# Patient Record
Sex: FEMALE | Race: WHITE | Employment: FULL TIME
[De-identification: names, ages, dates, MRNs, and addresses within clinical notes are randomized per-mention and may not be internally consistent; named-entity substitution may affect disease eponyms.]

---

## 2017-07-22 ENCOUNTER — HEALTH MAINTENANCE LETTER (OUTPATIENT)
Age: 57
End: 2017-07-22

## 2017-10-07 ENCOUNTER — HEALTH MAINTENANCE LETTER (OUTPATIENT)
Age: 57
End: 2017-10-07

## 2017-10-24 ENCOUNTER — HOSPITAL ENCOUNTER (OUTPATIENT)
Dept: MAMMOGRAPHY | Facility: CLINIC | Age: 57
Discharge: HOME | End: 2017-10-24
Attending: PEDIATRICS | Admitting: PEDIATRICS
Payer: COMMERCIAL

## 2017-10-24 DIAGNOSIS — Z12.31 VISIT FOR SCREENING MAMMOGRAM: ICD-10-CM

## 2017-10-24 PROCEDURE — 77063 BREAST TOMOSYNTHESIS BI: CPT

## 2018-10-17 ENCOUNTER — MEDICAL CORRESPONDENCE (OUTPATIENT)
Dept: HEALTH INFORMATION MANAGEMENT | Facility: CLINIC | Age: 58
End: 2018-10-17

## 2018-10-17 ENCOUNTER — TRANSFERRED RECORDS (OUTPATIENT)
Dept: HEALTH INFORMATION MANAGEMENT | Facility: CLINIC | Age: 58
End: 2018-10-17

## 2018-10-18 ENCOUNTER — TELEPHONE (OUTPATIENT)
Dept: OTOLARYNGOLOGY | Facility: CLINIC | Age: 58
End: 2018-10-18

## 2018-10-18 NOTE — TELEPHONE ENCOUNTER
"Spoke with pt regarding new symptoms of perforation with scab. Pt states local ENT thought this was beginning to heal, however there was a \"blood tinged streak on the inside of the ear\" which pt finds concerning. She would like to know if we would like to do an MRI prior to her upcoming appt. Explained that per note from 2016 no further scans were needed because the chance of reoccurrence was so low. Pt should plan on coming to appt as scheduled with no imaging prior. However, her symptoms will be relayed to Dr. Morrow and if he has any additional recommendations we will contact her. Pt states understanding. Michela HARDY RNCC    "

## 2018-10-22 ENCOUNTER — TELEPHONE (OUTPATIENT)
Dept: OTOLARYNGOLOGY | Facility: CLINIC | Age: 58
End: 2018-10-22

## 2018-10-22 DIAGNOSIS — D18.00 GLOMUS TUMOR: Primary | ICD-10-CM

## 2018-10-22 NOTE — TELEPHONE ENCOUNTER
Spoke with pt regarding follow up plan. Relayed per Dr. Morrow we should do a CT Temporal Bones with and without contrast prior to her upcoming appt. Pt states understanding and requests assistance with scheduling. States she lives far away and would like to coordinate appts same day if possible. Michela HARDY RNCC

## 2018-10-24 ENCOUNTER — TELEPHONE (OUTPATIENT)
Dept: OTOLARYNGOLOGY | Facility: CLINIC | Age: 58
End: 2018-10-24

## 2018-11-21 DIAGNOSIS — H91.90 HEARING LOSS: Primary | ICD-10-CM

## 2018-11-27 ENCOUNTER — PATIENT OUTREACH (OUTPATIENT)
Dept: CARE COORDINATION | Facility: CLINIC | Age: 58
End: 2018-11-27

## 2018-11-27 NOTE — TELEPHONE ENCOUNTER
FUTURE VISIT INFORMATION      FUTURE VISIT INFORMATION:    Date: 11-28-18    Time:     Location:   REFERRAL INFORMATION:    Referring provider:  BRII GALLO    Referring providers clinic:  [facility]    Reason for visit/diagnosis  hearing loss     RECORDS REQUESTED FROM:       Clinic name Comments Records Status Imaging Status   [facility]  Requested

## 2018-11-28 ENCOUNTER — OFFICE VISIT (OUTPATIENT)
Dept: AUDIOLOGY | Facility: CLINIC | Age: 58
End: 2018-11-28
Attending: OTOLARYNGOLOGY
Payer: COMMERCIAL

## 2018-11-28 ENCOUNTER — PRE VISIT (OUTPATIENT)
Dept: OTOLARYNGOLOGY | Facility: CLINIC | Age: 58
End: 2018-11-28

## 2018-11-28 ENCOUNTER — RADIANT APPOINTMENT (OUTPATIENT)
Dept: CT IMAGING | Facility: CLINIC | Age: 58
End: 2018-11-28
Attending: OTOLARYNGOLOGY
Payer: COMMERCIAL

## 2018-11-28 ENCOUNTER — HOSPITAL ENCOUNTER (OUTPATIENT)
Dept: MAMMOGRAPHY | Facility: CLINIC | Age: 58
Discharge: HOME | End: 2018-11-28
Attending: PEDIATRICS | Admitting: PEDIATRICS
Payer: COMMERCIAL

## 2018-11-28 ENCOUNTER — OFFICE VISIT (OUTPATIENT)
Dept: OTOLARYNGOLOGY | Facility: CLINIC | Age: 58
End: 2018-11-28
Payer: COMMERCIAL

## 2018-11-28 DIAGNOSIS — H90.11 CONDUCTIVE HEARING LOSS OF RIGHT EAR WITH UNRESTRICTED HEARING OF LEFT EAR: Primary | ICD-10-CM

## 2018-11-28 DIAGNOSIS — Z12.31 VISIT FOR SCREENING MAMMOGRAM: ICD-10-CM

## 2018-11-28 DIAGNOSIS — D18.09 GLOMUS TUMOR OF EAR: Primary | ICD-10-CM

## 2018-11-28 DIAGNOSIS — D18.00 GLOMUS TUMOR: ICD-10-CM

## 2018-11-28 PROCEDURE — 77063 BREAST TOMOSYNTHESIS BI: CPT

## 2018-11-28 RX ORDER — IOPAMIDOL 755 MG/ML
75 INJECTION, SOLUTION INTRAVASCULAR ONCE
Status: COMPLETED | OUTPATIENT
Start: 2018-11-28 | End: 2018-11-28

## 2018-11-28 RX ADMIN — IOPAMIDOL 75 ML: 755 INJECTION, SOLUTION INTRAVASCULAR at 07:26

## 2018-11-28 ASSESSMENT — PAIN SCALES - GENERAL: PAINLEVEL: NO PAIN (0)

## 2018-11-28 NOTE — PROGRESS NOTES
Reason for Visit:  This is a 58-year-old patient whom I followed for a long period of time.      History of Present Illness:  She notes that recently she was examining her ear and found that there was a bulge in the posterior superior quadrant.  She had to [place] a flight to a  and after this noted that there was a perforation and some yellowish drainage.  She was seen and the perforation was identified by Dr. Gómez.    Review of Systems:  There is been no change in her medical condition otherwise.  She is not treated for any other new medical issue.    Past Medical History:  Her past history is positive for the fact that she has had this perforation identified previously.  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  In , I noted and she reports that she may have had the same problem several years ago.  She feels that the defect is larger now than when it has been seen in the past.  This began in August of this year and she feels that the perforation is staying open longer this time.    Exam: At the time of her surgery, I performed a wall down mastoidectomy and created a lateralized drum and mastoid covering.  This has a defect in the posterior superior quadrant which seems to recur.  The tympanic membrane itself is anatomically intact. Exam was completed with the operating microscope.    Audiogram:  Audiogram was done today and shows a right conductive hearing loss.  This is been the case previously.  It appears to be a stable fixed conductive component with relatively normal sensorineural thresholds.    Imaging: I obtained a new imaging study today.  There is no evidence of recurrence or disease in the opposite side.  There is no evidence of infection.    Impression/Plan: This is a stable defect in a very thin drum remnant.  Certainly this could be repaired with a rotation flap or even perhaps a fat graft.  I told her that I am no longer operating and certainly any of the  surgeons who would see her continue this.  The likelihood that it stays closed is low and I would expect that eventually this will require repair.  One can question the value of the repair in his case since she is not having otorrhea, or changes in hearing.  There is no underlying disease process and its only an issue of appearance and intermittent opening.      SL/ms

## 2018-11-28 NOTE — LETTER
2018      RE: Almaz Rivera  [address] [facility] [address]       Reason for Visit:  This is a 58-year-old patient whom I followed for a long period of time.      History of Present Illness:  She notes that recently she was examining her ear and found that there was a bulge in the posterior superior quadrant.  She had to take a flight to a  and after this noted that there was a perforation and some yellowish drainage.  She was seen and the perforation was identified by Dr. Gómez.    Review of Systems:  There is been no change in her medical condition otherwise.  She is not treated for any other new medical issue.    Past Medical History:  Her past history is positive for the fact that she has had this perforation identified previously.  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  In , I noted and she reports that she may have had the same problem several years ago.  She feels that the defect is larger now than when it has been seen in the past.  This began in August of this year and she feels that the perforation is staying open longer this time.    Exam: At the time of her surgery, I performed a wall down mastoidectomy and created a lateralized drum and mastoid covering.  This has a defect in the posterior superior quadrant which seems to recur.  The tympanic membrane itself is anatomically intact. Exam was completed with the operating microscope.    Audiogram:  Audiogram was done today and shows a right conductive hearing loss.  This is been the case previously.  It appears to be a stable fixed conductive component with relatively normal sensorineural thresholds.    Imaging: I obtained a new imaging study today.  There is no evidence of recurrence or disease in the opposite side.  There is no evidence of infection.    Impression/Plan: This is a stable defect in a very thin drum remnant.  Certainly this could be repaired with a rotation flap or even  perhaps a fat graft.  I told her that I am no longer operating and certainly any of the surgeons who would see her continue this.  The likelihood that it stays closed is low and I would expect that eventually this will require repair.  One can question the value of the repair in his case since she is not having otorrhea, or changes in hearing.  There is no underlying disease process and its only an issue of appearance and intermittent opening.        Royal Morrow MD

## 2018-11-28 NOTE — DISCHARGE INSTRUCTIONS

## 2018-11-28 NOTE — MR AVS SNAPSHOT
After Visit Summary   11/28/2018    Almaz Rivera    MRN: [id number]           Patient Information     Date Of Birth          1960        Visit Information        Provider Department      11/28/2018 9:00 AM Royal Morrow MD [facility] Ear Nose and Throat        Today's Diagnoses     Glomus tumor of ear    -  1      Care Instructions    You may follow up locally.        Please call our clinic for any questions,concerns,or worsening symptoms.      Clinic #[phone]       Option 3  for the ENT Care Team.       Option 1 for scheduling.    Michela HARDY RNCC  [phone]          Follow-ups after your visit        Who to contact     Please call your clinic at [phone] to:    Ask questions about your health    Make or cancel appointments    Discuss your medicines    Learn about your test results    Speak to your doctor            Additional Information About Your Visit        MyChart Information     [email or web address] gives you secure access to your electronic health record. If you see a primary care provider, you can also send messages to your care team and make appointments. If you have questions, please call your primary care clinic.  If you do not have a primary care provider, please call [phone] and they will assist you.      [email or web address] is an electronic gateway that provides easy, online access to your medical records. With [email or web address], you can request a clinic appointment, read your test results, renew a prescription or communicate with your care team.     To access your existing account, please contact your [facility] or call [phone] for assistance.        Care EveryWhere ID     This is your Care EveryWhere ID. This could be used by other organizations to access your [place] medical records  [id number]         Blood Pressure from Last 3 Encounters:   01/14/14 132/72   12/30/13 138/80   12/23/13 (!) 168/96    Weight from Last 3 Encounters:   11/15/13 76.7 kg (169  lb 1.5 oz)   07/22/13 70.3 kg (155 lb)   02/12/13 87.7 kg (193 lb 6.4 oz)              We Performed the Following     BINOCULAR MICROSCOPY     DEBRIDMENT MASTOID CAVITY, SIMPLE        Primary Care Provider Office Phone # Fax #    Lilliana Gonzalez [phone]       [facility] [place] [address]  [place] [address]        Equal Access to Services     JOHN HERNANDEZ : Hadii aad ku hadasho Soomaali, waaxda luqadaha, qaybta kaalmada adeegyada, waxay idiin hayaan adeeg kharash lavincent . So [facility] [phone].    ATENCIÓN: Si carolla evens, tiene a austin disposición servicios gratuitos de asistencia lingüística. Llame al [phone].    We comply with applicable federal civil rights laws and [place] laws. We do not discriminate on the basis of race, color, national origin, age, disability, sex, sexual orientation, or gender identity.            Thank you!     Thank you for choosing [facility] EAR NOSE AND THROAT  for your care. Our goal is always to provide you with excellent care. Hearing back from our patients is one way we can continue to improve our services. Please take a few minutes to complete the written survey that you may receive in the mail after your visit with us. Thank you!             Your Updated Medication List - Protect others around you: Learn how to safely use, store and throw away your medicines at www.disposemymeds.org.          This list is accurate as of 11/28/18 11:59 PM.  Always use your most recent med list.                   Brand Name Dispense Instructions for use Diagnosis    CALCIUM-MAGNESIUM-VITAMIN D ER PO      Take 1 tablet by mouth daily.    Knee pain       multivitamin per tablet     100    1 qd

## 2018-11-28 NOTE — PROGRESS NOTES
AUDIOLOGY REPORT    SUMMARY: Audiology visit completed. See audiogram for results.      RECOMMENDATIONS: Follow-up with ENT.      Torsten Aggarwal, F-AAA   Clinical Audiologist, [place] #[id number]   11/28/2018

## 2018-11-28 NOTE — LETTER
2018       RE: Almaz Rivera  [address] [facility] [address]     Dear Colleague,    Thank you for referring your patient, Almaz Rivera, to the [facility] EAR NOSE AND THROAT at [facility]. Please see a copy of my visit note below.    Reason for Visit:  This is a 58-year-old patient whom I followed for a long period of time.      History of Present Illness:  She notes that recently she was examining her ear and found that there was a bulge in the posterior superior quadrant.  She had to take a flight to a  and after this noted that there was a perforation and some yellowish drainage.  She was seen and the perforation was identified by Dr. Gómez.    Review of Systems:  There is been no change in her medical condition otherwise.  She is not treated for any other new medical issue.    Past Medical History:  Her past history is positive for the fact that she has had this perforation identified previously.  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  In , I noted and she reports that she may have had the same problem several years ago.  She feels that the defect is larger now than when it has been seen in the past.  This began in August of this year and she feels that the perforation is staying open longer this time.    Exam: At the time of her surgery, I performed a wall down mastoidectomy and created a lateralized drum and mastoid covering.  This has a defect in the posterior superior quadrant which seems to recur.  The tympanic membrane itself is anatomically intact. Exam was completed with the operating microscope.    Audiogram:  Audiogram was done today and shows a right conductive hearing loss.  This is been the case previously.  It appears to be a stable fixed conductive component with relatively normal sensorineural thresholds.    Imaging: I obtained a new imaging study today.  There is no evidence of recurrence or  disease in the opposite side.  There is no evidence of infection.    Impression/Plan: This is a stable defect in a very thin drum remnant.  Certainly this could be repaired with a rotation flap or even perhaps a fat graft.  I told her that I am no longer operating and certainly any of the surgeons who would see her continue this.  The likelihood that it stays closed is low and I would expect that eventually this will require repair.  One can question the value of the repair in his case since she is not having otorrhea, or changes in hearing.  There is no underlying disease process and its only an issue of appearance and intermittent opening.      Royal Morrow MD

## 2018-11-28 NOTE — PATIENT INSTRUCTIONS
You may follow up locally.        Please call our clinic for any questions,concerns,or worsening symptoms.      Clinic #[phone]       Option 3  for the ENT Care Team.       Option 1 for scheduling.    YADI Honeycutt  [phone]

## 2018-11-28 NOTE — MR AVS SNAPSHOT
After Visit Summary   11/28/2018    Almaz Rivera    MRN: [id number]           Patient Information     Date Of Birth          1960        Visit Information        Provider Department      11/28/2018 8:00 AM Kylee Hatfield AuD M [facility] Audiology        Today's Diagnoses     Conductive hearing loss of right ear with unrestricted hearing of left ear    -  1       Follow-ups after your visit        Your next 10 appointments already scheduled     Nov 28, 2018  9:00 AM CST   (Arrive by 8:45 AM)   NEW NEUROTOLOGY VISIT with MD JUDY Krishna [facility] Ear Nose and Throat ([facility])    [address]  4th Floor  [facility] [address]   [phone]            Nov 28, 2018 12:05 PM CST   MA SCREENING BILATERAL W/ ELENA with [id number]   [facility] ([facility])    [address]  [facility] [address] [phone]           How do I prepare for my exam? (Food and drink instructions) No Food and Drink Restrictions.  How do I prepare for my exam? (Other instructions) Do not use any powder, lotion or deodorant under your arms or on your breast. If you do, we will ask you to remove it before your exam.  What should I wear: Wear comfortable, two-piece clothing.  How long does the exam take: Most scans will take 15 minutes.  What should I bring: Bring any previous mammograms from other facilities or have them mailed to the breast center.  Do I need a :  No  is needed.  What do I need to tell my doctor: If you have any allergies, tell your care team.  What should I do after the exam: No restrictions, You may resume normal activities.  What is this test: This test is an x-ray of the breast to look for breast disease. The breast is pressed between two plates to flatten and spread the tissue. An X-ray is taken of the breast from different angles.  Who should I call with questions: If you have any questions,  "please call the Imaging Department where you will have your exam. Directions, parking instructions, and other information is available on our website, [email or web address].  Other information about my exam Three-dimensional (3D) mammograms are available at [facility] locations in [facility], [place], and [place]. [facility] locations include [place] and the [facility] in [place].  Benefits of 3D mammograms include: * Improved rate of cancer detection * Decreases your chance of having to go back for more tests, which means fewer: * \"False-positive\" results (This means that there is an abnormal area but it isn't cancer.) * Invasive testing procedures, such as a biopsy or surgery * Can provide clearer images of the breast if you have dense breast tissue.  *3D mammography is an optional exam that anyone can have with a 2D mammogram. It doesn't replace or take the place of a 2D mammogram. 2D mammograms remain an effective screening test for all women.  Not all insurance companies cover the cost of a 3D mammogram. Check with your insurance.              Who to contact     Please call your clinic at [phone] to:    Ask questions about your health    Make or cancel appointments    Discuss your medicines    Learn about your test results    Speak to your doctor            Additional Information About Your Visit        [employer] Information     [employer] gives you secure access to your electronic health record. If you see a primary care provider, you can also send messages to your care team and make appointments. If you have questions, please call your primary care clinic.  If you do not have a primary care provider, please call [phone] and they will assist you.      [employer] is an electronic gateway that provides easy, online access to your medical records. With [employer], you can request a clinic appointment, read your test results, renew a prescription or " communicate with your care team.     To access your existing account, please contact your [facility] or call [phone] for assistance.        Care EveryWhere ID     This is your Care EveryWhere ID. This could be used by other organizations to access your [place] medical records  [id number]         Blood Pressure from Last 3 Encounters:   01/14/14 132/72   12/30/13 138/80   12/23/13 (!) 168/96    Weight from Last 3 Encounters:   11/15/13 76.7 kg (169 lb 1.5 oz)   07/22/13 70.3 kg (155 lb)   02/12/13 87.7 kg (193 lb 6.4 oz)              We Performed the Following     AUDIOGRAM/TYMPANOGRAM - INTERFACE     AUDIOLOGY ADULT REFERRAL     [facility] Recog ([address]444)     Reduced 52 - Tymps / Reflex   (05593)        Primary Care Provider Office Phone # Fax #    Lilliana oGnzalez [phone]       [facility] [place] [address]  [place] [facility] [place] [address]        Equal Access to Services     [facility]: Hadii aad ku hadasho Soomaali, waaxda luqadaha, qaybta kaalmada adeegyada, waxay ildefonso hayvivi alicia . So [facility] [phone].    ATENCIÓN: Si habla español, tiene a austin disposición servicios gratuitos de asistencia lingüística. Xiomara al [phone].    We comply with applicable federal civil rights laws and [place] laws. We do not discriminate on the basis of race, color, national origin, age, disability, sex, sexual orientation, or gender identity.            Thank you!     Thank you for choosing [facility] AUDIOLOGY  for your care. Our goal is always to provide you with excellent care. Hearing back from our patients is one way we can continue to improve our services. Please take a few minutes to complete the written survey that you may receive in the mail after your visit with us. Thank you!             Your Updated Medication List - Protect others around you: Learn how to safely use, store and throw away your medicines at  www.disposemymeds.org.          This list is accurate as of 11/28/18  8:39 AM.  Always use your most recent med list.                   Brand Name Dispense Instructions for use Diagnosis    CALCIUM-MAGNESIUM-VITAMIN D ER PO      Take 1 tablet by mouth daily.    Knee pain       multivitamin per tablet     100    1 qd

## 2018-11-28 NOTE — NURSING NOTE
Chief Complaint   Patient presents with     Consult     hearing loss      Roomed by JESSIE Dunaway, EMT

## 2018-12-12 ENCOUNTER — HOSPITAL ENCOUNTER (OUTPATIENT)
Dept: MAMMOGRAPHY | Facility: CLINIC | Age: 58
Discharge: HOME | End: 2018-12-12
Attending: PEDIATRICS | Admitting: PEDIATRICS
Payer: COMMERCIAL

## 2018-12-12 ENCOUNTER — HOSPITAL ENCOUNTER (OUTPATIENT)
Dept: ULTRASOUND IMAGING | Facility: CLINIC | Age: 58
End: 2018-12-12
Attending: PEDIATRICS
Payer: COMMERCIAL

## 2018-12-12 ENCOUNTER — HOSPITAL ENCOUNTER (OUTPATIENT)
Dept: MAMMOGRAPHY | Facility: CLINIC | Age: 58
End: 2018-12-12
Attending: PEDIATRICS
Payer: COMMERCIAL

## 2018-12-12 DIAGNOSIS — R92.8 ABNORMAL MAMMOGRAM: ICD-10-CM

## 2018-12-12 PROCEDURE — 88305 TISSUE EXAM BY PATHOLOGIST: CPT | Performed by: PEDIATRICS

## 2018-12-12 PROCEDURE — 27210206 US BREAST BIOPSY CORE NEEDLE RIGHT

## 2018-12-12 PROCEDURE — 40000986 MA POST PROCEDURE RIGHT

## 2018-12-12 PROCEDURE — 25000125 ZZHC RX 250: Performed by: RADIOLOGY

## 2018-12-12 PROCEDURE — 88305 TISSUE EXAM BY PATHOLOGIST: CPT | Mod: 26 | Performed by: PEDIATRICS

## 2018-12-12 PROCEDURE — 76642 ULTRASOUND BREAST LIMITED: CPT | Mod: RT

## 2018-12-12 PROCEDURE — 77065 DX MAMMO INCL CAD UNI: CPT | Mod: RT

## 2018-12-12 RX ADMIN — LIDOCAINE HYDROCHLORIDE 5 ML: 10 INJECTION, SOLUTION EPIDURAL; INFILTRATION; INTRACAUDAL; PERINEURAL at 11:22

## 2018-12-12 NOTE — DISCHARGE INSTRUCTIONS
Page 1 of 1  For informational purposes only. Not to replace the advice of your health care provider. Copyright   2010 [facility]. All rights reserved. sciencebite 609001 - REV 02/16.  After Your Breast Biopsy   Bleeding or bruising  Slight bruising is normal. If you bleed through the bandage, put direct pressure on the breast for 10 minutes.   If the breast begins to swell, or you have a lot of bleeding after 10 minutes of pressure, call the doctor who ordered your exam. Or, go to the emergency room.   Bandages  Keep your bandage in place until tomorrow morning. Do not get it wet.   If you have small pieces of tape on the skin, leave them in place. They will fall off on their own, or you can remove them after 5 days.   Activity  You may shower the morning after the exam. No heavy activity (lifting, vacuuming) on the day of your exam. You may go back to normal activity the next day, unless you had a lot of bleeding or pain.  Discomfort  You may take Tylenol (acetaminophen) today for pain. Tomorrow, you may take an anti-inflammatory medicine (aspirin, ibuprofen, Motrin, Aleve, Advil), unless your doctor tells you not to.  Wear your bra overnight to support the breast. You may also use an ice pack: Place it over the area for 15-20 minutes several times a day.  Infection  Infection is rare. Symptoms include fever, redness, increasing pain and fluid draining from the biopsy site. If you have any of these symptoms, please call the doctor who ordered your exam.  Results  Results may take up to 5 business days. A nurse or doctor from the Breast Center will call with your results. We will also send the results to the doctor who ordered your biopsy.  If you have not heard your results in 5 days, please call the Breast Center.   Other instructions  ______________________________________________________________________________________________________________________________  Call your doctor if:    You have  bleeding that lasts more than 10 minutes.    You have pain that cannot be controlled.   You have signs of infection (fever, redness, drainage or other signs).   You have not received your results within 5 days.    Please call the Breast Center nurse navigator at [phone] if you have questions or concerns about your biopsy.

## 2018-12-13 ENCOUNTER — TELEPHONE (OUTPATIENT)
Dept: ULTRASOUND IMAGING | Facility: CLINIC | Age: 58
End: 2018-12-13

## 2018-12-13 LAB — COPATH REPORT: NORMAL

## 2018-12-13 NOTE — TELEPHONE ENCOUNTER
Pathology report reviewed with breast radiologist Juwan. 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   - No evidence of malignancy.       Recommended follow up is surgical consult.  She has been set up to see Dr Lopez on 12-19-18 at 0900 arriving at 0830.  Patient states no problems with biopsy site. Questions were answered and my phone number given if she has further questions or concerns. Patient agrees to plan.

## 2018-12-18 NOTE — PROGRESS NOTES
[facility]  New Patient Office Visit    Assessment:    Almaz Rivera is seen in consultation for right breast atypia, at the request of Lilliana Gonzalez MD.    Almaz is a 58 year old female with right breast flat epithelial atypia on biopsy of right breast calcifications at 7:00 and 9 cm from the nipple.    Plan:  Excisional biopsy with seed localization to ensure no DCIS or cancer     We have discussed the indication, alternatives, risks and expected recovery.  Specifically, postoperative infection, anesthesia, bleeding, blood transfusion, DVT, PE, postoperative restrictions and physical limitations.  We have discussed the recommended interventions and treatments for these outcomes.    All questions have been answered to the best of my ability.    Breast MRI:  no  Oncology consult:  no  Plastic surgery consult:  no  Radiation oncology consult:  no  Recommended time off work postop:  2 days - 1 wk    HPI:  Almaz Rivera is a 58 year old female who presents to discuss her recently diagnosed flat epithelial atypia.  This was diagnosed via screening bilateral mammography and core needle biopsy. Gets 3D mammograms yearly due to dense breast tissue.    Breast mass noted:  none   Skin rashes, dimpling or nipple changes:  none  Nipple discharge:  none  Breast pain:   No  Perform self breast exams: No    Previous breast biopsies: Yes - on the left side in 2014, now right side last week  Previous cyst aspiration: No  Previous other breast surgery: No    Hormonal history:    First menstrual period: 14 years old  First live birth: 28 years old  Post menopausal  HRT No  Fertility treatment: No    Family History:  Family history of breast cancer: Yes - paternal grandmother (also had uterine cancer after years on tamoxifen)  Family history of ovarian cancer:  No  Family history of colon cancer: No  Family history of prostate cancer: No    Imaging:   All imaging studies reviewed by me.    Mammogram and Ultrasound  12/12/18  FINDINGS:  Additional mammographic views demonstrate a 5 mm cluster of  amorphous calcifications in the right breast at 7:00 9 cm from the  nipple.    Targeted ultrasound evaluation of the right breast at 7:00 9 cm from  the nipple demonstrates a small hypoechoic mass measuring 7 mm in  diameter with multiple punctate echogenic foci, likely corresponding  to the mammographic finding.     Percutaneous core needle biopsy: FINAL DIAGNOSIS:   Breast, right/7:00-9 cm from nipple, ultrasound guided core biopsy:   - Columnar cell change and columnar cell change with atypia (flat   epithelial atypia) with calcifications.   - No evidence of malignancy.       Past Medical History:   has a past medical history of Closed fracture of lower end of radius with ulna (4/6/10), Dysfunction of eustachian tube (2002), Dysfunction of eustachian tube (2/21/06), Post term pregnancy, delivered with or without mention of antepartum condition, Tinnitus, and Unspecified tinnitus.   Postop nausea and vomiting    Past Surgical History:  Past Surgical History:   Procedure Laterality Date     C AFF OUTER EAR SURGERY PROC UNLISTED  4/1/02    Excision hemangioma (R) ear canal     C REMV AURAL GLOMUS TUMOR,TRANSMASTOID  2/16/04    Resection of (R) glomus tumor using transmastoid & infracochlear approach-[facility]     GI SURGERY       HC COLONOSCOPY W BIOPSY  8/2/12      COLONOSCOPY W SNARE REMOVAL TUMOR/POLYP/LESION  8/2/12     HC HYSTEROSCOPY, SURGICAL; W/ ENDOMETRIAL ABLATION, ANY METHOD  1/18/11     HC LAP,TUBAL CAUTERY  8/20/98     HC LAPAROSCOPY, SURGICAL; CHOLECYSTECTOMY  12/12/13     HC OPEN RX DISTAL RADIUS FX, INTRA-ARTICULAR, 2 FRAG  4/6/10    LT     ORTHOPEDIC SURGERY          Social History:  Social History     Socioeconomic History     Marital status:      Spouse name: Nolan     Number of children: 2     Years of education: 16     Highest education level: Not on file   Social Needs     Financial resource strain: Not on  "file     Food insecurity - worry: Not on file     Food insecurity - inability: Not on file     Transportation needs - medical: Not on file     Transportation needs - non-medical: Not on file   Occupational History     Occupation: business owner     Employer: [employer]   Tobacco Use     Smoking status: Never Smoker     Smokeless tobacco: Never Used     Tobacco comment: no 2nd hand   Substance and Sexual Activity     Alcohol use: Yes     Comment: Socially     Drug use: No     Sexual activity: Yes     Partners: Male     Birth control/protection: Post-menopausal, Female Surgical   Other Topics Concern      Service No     Blood Transfusions No     Caffeine Concern No     Occupational Exposure No     Hobby Hazards No     Sleep Concern No     Stress Concern No     Weight Concern No     Special Diet No     Back Care No     Exercise Yes     Comment: Elypticle,stationery bike     Bike Helmet Not Asked     Seat Belt Yes     Self-Exams Yes   Social History Narrative    Women's Health Kit given    08/20/98            ROS:  The 10 point review of systems is negative other than noted in the HPI and above.    PE:  Vitals: /80 (BP Location: Left arm, Cuff Size: Adult Large)   Pulse 86   Resp 16   Ht 1.651 m (5' 5\")   Wt 90.7 kg (200 lb)   SpO2 96%   BMI 33.28 kg/m    General appearance: well-nourished, sitting comfortably, no apparent distress  HEENT:  Head normocephalic and atraumatic, pupils equal and round, conjunctivae clear, mucous membranes moist, external ears and nose normal  Lungs: Respirations unlabored  CV: regular rate and rhythm  Lymphatic: No supraclavicular lymphadenopathy  Musculoskeletal:  Normal station and gait, extremities without edema  Neurologic: alert, speech is clear, moves all extremities with good strength  Psychiatric: Mood and affect are appropriate  Skin: Without lesions, rashes, or jaundice  Breast:    Symmetrical with no skin or nipple changes.     Contour is " normal.   Parenchyma is extremely dense.   Masses- none    Ecchymosis- none   Incisional scar- none    Lymph:       No supraclavicular/infraclavicular adenopathy.   Axillary adenopathy: none      This note may have been created using voice recognition software. Undetected word substitutions or other errors may have occurred.     Time spent with the patient with greater that 50% of the time in discussion was 30 minutes.     Marifer Lopez MD  12/18/18 4:23 PM     Please route or send letter to:  Primary Care Provider (PCP)

## 2018-12-19 ENCOUNTER — OFFICE VISIT (OUTPATIENT)
Dept: SURGERY | Facility: CLINIC | Age: 58
End: 2018-12-19
Payer: COMMERCIAL

## 2018-12-19 ENCOUNTER — TELEPHONE (OUTPATIENT)
Dept: SURGERY | Facility: CLINIC | Age: 58
End: 2018-12-19

## 2018-12-19 VITALS
HEIGHT: 65 IN | BODY MASS INDEX: 33.32 KG/M2 | DIASTOLIC BLOOD PRESSURE: 80 MMHG | WEIGHT: 200 LBS | OXYGEN SATURATION: 96 % | SYSTOLIC BLOOD PRESSURE: 120 MMHG | RESPIRATION RATE: 16 BRPM | HEART RATE: 86 BPM

## 2018-12-19 DIAGNOSIS — N60.81 FLAT EPITHELIAL ATYPIA (FEA) OF RIGHT BREAST: Primary | ICD-10-CM

## 2018-12-19 PROCEDURE — 99243 OFF/OP CNSLTJ NEW/EST LOW 30: CPT | Performed by: SURGERY

## 2018-12-19 ASSESSMENT — ENCOUNTER SYMPTOMS: BRUISES/BLEEDS EASILY: 1

## 2018-12-19 ASSESSMENT — MIFFLIN-ST. JEOR: SCORE: 1498.07

## 2018-12-19 NOTE — LETTER
2018    Re: Almaz Rivera - 1960    Almaz Rivera is seen in consultation for right breast atypia, at the request of Lilliana Gonzalez MD.     Almaz is a 58 year old female with right breast flat epithelial atypia on biopsy of right breast calcifications at 7:00 and 9 cm from the nipple.     Plan:  Excisional biopsy with seed localization to ensure no DCIS or cancer     We have discussed the indication, alternatives, risks and expected recovery.  Specifically, postoperative infection, anesthesia, bleeding, blood transfusion, DVT, PE, postoperative restrictions and physical limitations.  We have discussed the recommended interventions and treatments for these outcomes.    All questions have been answered to the best of my ability.     Breast MRI:  no  Oncology consult:  no  Plastic surgery consult:  no  Radiation oncology consult:  no  Recommended time off work postop:  2 days - 1 wk     HPI:  Almaz Rivera is a 58 year old female who presents to discuss her recently diagnosed flat epithelial atypia.  This was diagnosed via screening bilateral mammography and core needle biopsy. Gets 3D mammograms yearly due to dense breast tissue.     Breast mass noted:  none   Skin rashes, dimpling or nipple changes:  none  Nipple discharge:  none  Breast pain:   No  Perform self breast exams: No     Previous breast biopsies: Yes - on the left side in , now right side last week  Previous cyst aspiration: No  Previous other breast surgery: No     Hormonal history:    First menstrual period: 14 years old  First live birth: 28 years old  Post menopausal  HRT No  Fertility treatment: No     Family History:  Family history of breast cancer: Yes - paternal grandmother (also had uterine cancer after years on tamoxifen)  Family history of ovarian cancer:  No  Family history of colon cancer: No  Family history of prostate cancer: No     Imaging:   All imaging studies reviewed by me.     Mammogram and  "Ultrasound 12/12/18  FINDINGS:  Additional mammographic views demonstrate a 5 mm cluster of  amorphous calcifications in the right breast at 7:00 9 cm from the  nipple.     Targeted ultrasound evaluation of the right breast at 7:00 9 cm from  the nipple demonstrates a small hypoechoic mass measuring 7 mm in  diameter with multiple punctate echogenic foci, likely corresponding  to the mammographic finding.      Percutaneous core needle biopsy: FINAL DIAGNOSIS:   Breast, right/7:00-9 cm from nipple, ultrasound guided core biopsy:   - Columnar cell change and columnar cell change with atypia (flat   epithelial atypia) with calcifications.   - No evidence of malignancy.         Past Medical History:   has a past medical history of Closed fracture of lower end of radius with ulna (4/6/10), Dysfunction of eustachian tube (2002), Dysfunction of eustachian tube (2/21/06), Post term pregnancy, delivered with or without mention of antepartum condition, Tinnitus, and Unspecified tinnitus.   Postop nausea and vomiting             ROS:  The 10 point review of systems is negative other than noted in the HPI and above.     PE:  Vitals: /80 (BP Location: Left arm, Cuff Size: Adult Large)   Pulse 86   Resp 16   Ht 1.651 m (5' 5\")   Wt 90.7 kg (200 lb)   SpO2 96%   BMI 33.28 kg/m    General appearance: well-nourished, sitting comfortably, no apparent distress  HEENT:  Head normocephalic and atraumatic, pupils equal and round, conjunctivae clear, mucous membranes moist, external ears and nose normal  Lungs: Respirations unlabored  CV: regular rate and rhythm  Lymphatic: No supraclavicular lymphadenopathy  Musculoskeletal:  Normal station and gait, extremities without edema  Neurologic: alert, speech is clear, moves all extremities with good strength  Psychiatric: Mood and affect are appropriate  Skin: Without lesions, rashes, or jaundice  Breast:               Symmetrical with no skin or nipple changes.                " Contour is normal.              Parenchyma is extremely dense.              Masses- none               Ecchymosis- none              Incisional scar- none     Lymph:                         No supraclavicular/infraclavicular adenopathy.              Axillary adenopathy: none            Marifer Lopez MD

## 2018-12-19 NOTE — TELEPHONE ENCOUNTER
Type of surgery: RIGHT BREAST SEED LOCALIZED EXCISIONAL BIOPSY   Location of surgery: [facility]  Date and time of surgery: 1/18/2019  Surgeon: DR. MANRIQUEZ   Pre-Op Appt Date: PATIENT TO SCHEDULE   Post-Op Appt Date: PATIENT TO SCHEDULE    Packet sent out: GIVEN TO PATIENT   Pre-cert/Authorization completed:  Not Applicable  Date: 12-19-18 CHANGED 1/7/2019      RIGHT BREAST SEED LOCALIZED EXCISIONAL BIOPSY  MAC  PT INST TO HAVE H&P WITH DR. BAHENA   60 MINS REQ  PA ASSIST JLS   ALW   R Seed loc at 10 am  NMS

## 2019-01-17 ENCOUNTER — ANESTHESIA EVENT (OUTPATIENT)
Dept: SURGERY | Facility: CLINIC | Age: 59
End: 2019-01-17
Payer: COMMERCIAL

## 2019-01-18 ENCOUNTER — ANESTHESIA (OUTPATIENT)
Dept: SURGERY | Facility: CLINIC | Age: 59
End: 2019-01-18
Payer: COMMERCIAL

## 2019-01-18 ENCOUNTER — APPOINTMENT (OUTPATIENT)
Dept: SURGERY | Facility: PHYSICIAN GROUP | Age: 59
End: 2019-01-18
Payer: COMMERCIAL

## 2019-01-18 ENCOUNTER — DOCUMENTATION ONLY (OUTPATIENT)
Dept: MAMMOGRAPHY | Facility: CLINIC | Age: 59
End: 2019-01-18

## 2019-01-18 ENCOUNTER — HOSPITAL ENCOUNTER (OUTPATIENT)
Dept: ULTRASOUND IMAGING | Facility: CLINIC | Age: 59
End: 2019-01-18
Attending: SURGERY | Admitting: SURGERY
Payer: COMMERCIAL

## 2019-01-18 ENCOUNTER — HOSPITAL ENCOUNTER (OUTPATIENT)
Facility: CLINIC | Age: 59
Discharge: HOME | End: 2019-01-18
Attending: SURGERY | Admitting: SURGERY
Payer: COMMERCIAL

## 2019-01-18 ENCOUNTER — HOSPITAL ENCOUNTER (OUTPATIENT)
Dept: MAMMOGRAPHY | Facility: CLINIC | Age: 59
End: 2019-01-18
Attending: SURGERY | Admitting: SURGERY
Payer: COMMERCIAL

## 2019-01-18 VITALS
DIASTOLIC BLOOD PRESSURE: 88 MMHG | WEIGHT: 201.3 LBS | BODY MASS INDEX: 33.54 KG/M2 | RESPIRATION RATE: 16 BRPM | TEMPERATURE: 96.7 F | SYSTOLIC BLOOD PRESSURE: 140 MMHG | OXYGEN SATURATION: 94 % | HEIGHT: 65 IN

## 2019-01-18 DIAGNOSIS — N60.81 FLAT EPITHELIAL ATYPIA OF RIGHT BREAST: Primary | ICD-10-CM

## 2019-01-18 DIAGNOSIS — N60.81 FLAT EPITHELIAL ATYPIA (FEA) OF RIGHT BREAST: ICD-10-CM

## 2019-01-18 PROCEDURE — 25000128 H RX IP 250 OP 636: Performed by: PHYSICIAN ASSISTANT

## 2019-01-18 PROCEDURE — 25000125 ZZHC RX 250: Performed by: ANESTHESIOLOGY

## 2019-01-18 PROCEDURE — 40000268 MA BREAST SPECIMEN RIGHT OR

## 2019-01-18 PROCEDURE — 25000125 ZZHC RX 250: Performed by: SURGERY

## 2019-01-18 PROCEDURE — 19125 EXCISION BREAST LESION: CPT | Performed by: SURGERY

## 2019-01-18 PROCEDURE — 36000054 ZZH SURGERY LEVEL 2 W FLUORO 1ST 30 MIN: Performed by: SURGERY

## 2019-01-18 PROCEDURE — 37000009 ZZH ANESTHESIA TECHNICAL FEE, EACH ADDTL 15 MIN: Performed by: SURGERY

## 2019-01-18 PROCEDURE — 25000125 ZZHC RX 250: Performed by: NURSE ANESTHETIST, CERTIFIED REGISTERED

## 2019-01-18 PROCEDURE — 25000128 H RX IP 250 OP 636: Performed by: ANESTHESIOLOGY

## 2019-01-18 PROCEDURE — 19285 PERQ DEV BREAST 1ST US IMAG: CPT | Mod: RT

## 2019-01-18 PROCEDURE — 71000027 ZZH RECOVERY PHASE 2 EACH 15 MINS: Performed by: SURGERY

## 2019-01-18 PROCEDURE — 36000052 ZZH SURGERY LEVEL 2 EA 15 ADDTL MIN: Performed by: SURGERY

## 2019-01-18 PROCEDURE — 37000008 ZZH ANESTHESIA TECHNICAL FEE, 1ST 30 MIN: Performed by: SURGERY

## 2019-01-18 PROCEDURE — 88307 TISSUE EXAM BY PATHOLOGIST: CPT | Mod: 26 | Performed by: SURGERY

## 2019-01-18 PROCEDURE — 88307 TISSUE EXAM BY PATHOLOGIST: CPT | Performed by: SURGERY

## 2019-01-18 PROCEDURE — 27210794 ZZH OR GENERAL SUPPLY STERILE: Performed by: SURGERY

## 2019-01-18 PROCEDURE — 40000305 ZZH STATISTIC PRE PROC ASSESS I: Performed by: SURGERY

## 2019-01-18 PROCEDURE — 25000128 H RX IP 250 OP 636: Performed by: NURSE ANESTHETIST, CERTIFIED REGISTERED

## 2019-01-18 PROCEDURE — 40000986 MA POST PROCEDURE RIGHT

## 2019-01-18 RX ORDER — ONDANSETRON 2 MG/ML
INJECTION INTRAMUSCULAR; INTRAVENOUS PRN
Status: DISCONTINUED | OUTPATIENT
Start: 2019-01-18 | End: 2019-01-18

## 2019-01-18 RX ORDER — DEXAMETHASONE SODIUM PHOSPHATE 4 MG/ML
INJECTION, SOLUTION INTRA-ARTICULAR; INTRALESIONAL; INTRAMUSCULAR; INTRAVENOUS; SOFT TISSUE PRN
Status: DISCONTINUED | OUTPATIENT
Start: 2019-01-18 | End: 2019-01-18

## 2019-01-18 RX ORDER — MEPERIDINE HYDROCHLORIDE 25 MG/ML
12.5 INJECTION INTRAMUSCULAR; INTRAVENOUS; SUBCUTANEOUS
Status: CANCELLED | OUTPATIENT
Start: 2019-01-18

## 2019-01-18 RX ORDER — SODIUM CHLORIDE, SODIUM LACTATE, POTASSIUM CHLORIDE, CALCIUM CHLORIDE 600; 310; 30; 20 MG/100ML; MG/100ML; MG/100ML; MG/100ML
INJECTION, SOLUTION INTRAVENOUS CONTINUOUS
Status: CANCELLED | OUTPATIENT
Start: 2019-01-18

## 2019-01-18 RX ORDER — HYDROMORPHONE HYDROCHLORIDE 1 MG/ML
.3-.5 INJECTION, SOLUTION INTRAMUSCULAR; INTRAVENOUS; SUBCUTANEOUS EVERY 10 MIN PRN
Status: CANCELLED | OUTPATIENT
Start: 2019-01-18

## 2019-01-18 RX ORDER — HYDRALAZINE HYDROCHLORIDE 20 MG/ML
2.5-5 INJECTION INTRAMUSCULAR; INTRAVENOUS EVERY 10 MIN PRN
Status: CANCELLED | OUTPATIENT
Start: 2019-01-18

## 2019-01-18 RX ORDER — ONDANSETRON 4 MG/1
4 TABLET, ORALLY DISINTEGRATING ORAL EVERY 30 MIN PRN
Status: CANCELLED | OUTPATIENT
Start: 2019-01-18

## 2019-01-18 RX ORDER — LIDOCAINE 40 MG/G
CREAM TOPICAL
Status: DISCONTINUED | OUTPATIENT
Start: 2019-01-18 | End: 2019-01-18 | Stop reason: HOSPADM

## 2019-01-18 RX ORDER — ACETAMINOPHEN 325 MG/1
975 TABLET ORAL ONCE
Status: CANCELLED | OUTPATIENT
Start: 2019-01-18 | End: 2019-01-18

## 2019-01-18 RX ORDER — FENTANYL CITRATE 50 UG/ML
25-50 INJECTION, SOLUTION INTRAMUSCULAR; INTRAVENOUS
Status: CANCELLED | OUTPATIENT
Start: 2019-01-18

## 2019-01-18 RX ORDER — SODIUM CHLORIDE, SODIUM LACTATE, POTASSIUM CHLORIDE, CALCIUM CHLORIDE 600; 310; 30; 20 MG/100ML; MG/100ML; MG/100ML; MG/100ML
INJECTION, SOLUTION INTRAVENOUS CONTINUOUS
Status: DISCONTINUED | OUTPATIENT
Start: 2019-01-18 | End: 2019-01-18 | Stop reason: HOSPADM

## 2019-01-18 RX ORDER — LABETALOL HYDROCHLORIDE 5 MG/ML
10 INJECTION, SOLUTION INTRAVENOUS
Status: CANCELLED | OUTPATIENT
Start: 2019-01-18

## 2019-01-18 RX ORDER — CEFAZOLIN SODIUM 1 G/3ML
1 INJECTION, POWDER, FOR SOLUTION INTRAMUSCULAR; INTRAVENOUS SEE ADMIN INSTRUCTIONS
Status: DISCONTINUED | OUTPATIENT
Start: 2019-01-18 | End: 2019-01-18 | Stop reason: HOSPADM

## 2019-01-18 RX ORDER — ONDANSETRON 2 MG/ML
4 INJECTION INTRAMUSCULAR; INTRAVENOUS EVERY 30 MIN PRN
Status: CANCELLED | OUTPATIENT
Start: 2019-01-18

## 2019-01-18 RX ORDER — CEFAZOLIN SODIUM 2 G/100ML
2 INJECTION, SOLUTION INTRAVENOUS
Status: COMPLETED | OUTPATIENT
Start: 2019-01-18 | End: 2019-01-18

## 2019-01-18 RX ORDER — FENTANYL CITRATE 50 UG/ML
INJECTION, SOLUTION INTRAMUSCULAR; INTRAVENOUS PRN
Status: DISCONTINUED | OUTPATIENT
Start: 2019-01-18 | End: 2019-01-18

## 2019-01-18 RX ORDER — TRAMADOL HYDROCHLORIDE 50 MG/1
50 TABLET ORAL EVERY 6 HOURS PRN
Qty: 5 TABLET | Refills: 0 | Status: SHIPPED | OUTPATIENT
Start: 2019-01-18 | End: 2019-01-21

## 2019-01-18 RX ORDER — ACETAMINOPHEN 325 MG/1
650 TABLET ORAL
Status: CANCELLED | OUTPATIENT
Start: 2019-01-18

## 2019-01-18 RX ORDER — PROPOFOL 10 MG/ML
INJECTION, EMULSION INTRAVENOUS CONTINUOUS PRN
Status: DISCONTINUED | OUTPATIENT
Start: 2019-01-18 | End: 2019-01-18

## 2019-01-18 RX ORDER — NALOXONE HYDROCHLORIDE 0.4 MG/ML
.1-.4 INJECTION, SOLUTION INTRAMUSCULAR; INTRAVENOUS; SUBCUTANEOUS
Status: CANCELLED | OUTPATIENT
Start: 2019-01-18 | End: 2019-01-19

## 2019-01-18 RX ADMIN — ONDANSETRON 4 MG: 2 INJECTION INTRAMUSCULAR; INTRAVENOUS at 12:03

## 2019-01-18 RX ADMIN — LIDOCAINE HYDROCHLORIDE 5 ML: 10 INJECTION, SOLUTION INFILTRATION; PERINEURAL at 10:00

## 2019-01-18 RX ADMIN — MIDAZOLAM 1 MG: 1 INJECTION INTRAMUSCULAR; INTRAVENOUS at 12:13

## 2019-01-18 RX ADMIN — LIDOCAINE HYDROCHLORIDE 30 MG: 10 INJECTION, SOLUTION EPIDURAL; INFILTRATION; INTRACAUDAL; PERINEURAL at 12:03

## 2019-01-18 RX ADMIN — MIDAZOLAM 2 MG: 1 INJECTION INTRAMUSCULAR; INTRAVENOUS at 11:57

## 2019-01-18 RX ADMIN — DEXAMETHASONE SODIUM PHOSPHATE 4 MG: 4 INJECTION, SOLUTION INTRA-ARTICULAR; INTRALESIONAL; INTRAMUSCULAR; INTRAVENOUS; SOFT TISSUE at 12:03

## 2019-01-18 RX ADMIN — MIDAZOLAM 1 MG: 1 INJECTION INTRAMUSCULAR; INTRAVENOUS at 12:03

## 2019-01-18 RX ADMIN — PROPOFOL 50 MCG/KG/MIN: 10 INJECTION, EMULSION INTRAVENOUS at 12:02

## 2019-01-18 RX ADMIN — CEFAZOLIN SODIUM 2 G: 2 INJECTION, SOLUTION INTRAVENOUS at 11:58

## 2019-01-18 RX ADMIN — SODIUM CHLORIDE, POTASSIUM CHLORIDE, SODIUM LACTATE AND CALCIUM CHLORIDE: 600; 310; 30; 20 INJECTION, SOLUTION INTRAVENOUS at 11:19

## 2019-01-18 RX ADMIN — FENTANYL CITRATE 50 MCG: 50 INJECTION, SOLUTION INTRAMUSCULAR; INTRAVENOUS at 12:03

## 2019-01-18 RX ADMIN — FENTANYL CITRATE 50 MCG: 50 INJECTION, SOLUTION INTRAMUSCULAR; INTRAVENOUS at 12:13

## 2019-01-18 ASSESSMENT — MIFFLIN-ST. JEOR: SCORE: 1503.97

## 2019-01-18 NOTE — TELEPHONE ENCOUNTER
Spoke with patient after seed localization procedure.  Patient voices feeling good, denies pain or feeling faint.  Education provided regarding home care after procedure.  Reviewed after surgical excision teaching sheet.  Patient transported via Nursing support to pre op. Denies concerns or further questions.

## 2019-01-18 NOTE — ANESTHESIA CARE TRANSFER NOTE
Patient: Almaz Rivera    Procedure(s):  right breast seed localized excisional biopsy    Diagnosis: right breast atyipa  Diagnosis Additional Information: No value filed.    Anesthesia Type:   MAC     Note:  Airway :Room Air  Patient transferred to:Phase II  Handoff Report: Identifed the Patient, Identified the Reponsible Provider, Reviewed the pertinent medical history, Discussed the surgical course, Reviewed Intra-OP anesthesia mangement and issues during anesthesia, Set expectations for post-procedure period and Allowed opportunity for questions and acknowledgement of understanding      Vitals: (Last set prior to Anesthesia Care Transfer)    CRNA VITALS  1/18/2019 1216 - 1/18/2019 1254      1/18/2019             Pulse:  72    SpO2:  99 %                Electronically Signed By: NENA Lipscomb CRNA  January 18, 2019  12:54 PM

## 2019-01-18 NOTE — PROGRESS NOTES
SBAR Seed Localization    SITUATION:  Patient to breast imaging center for imaging guided seed localizations before breast lumpectomy or excision biopsy without sentinel node injection.    BACKGROUND:  Breast imaging cancer, breast abnormality  Ordered procedure completed: Yes  Special needs identified: Yes     ASSESSMENT:  SBAR report called to patient care unit because of unexpected event in radiology: No  Allergies and medication list reviewed prior to procedure. Yes  Skin cleansed with ChloraPrep One-Step.  Anesthesia: approximately 5ml of 1% Lidocaine injection subcutaneous before seed insertion administered by the radiologist.   Gauze dressing over insertion site(s).  Post procedure mammogram completed: Yes    Patient tolerance: Patient tolerated procedure well.    RECOMMENDATIONS:  Patient transferred to [facility] Day Surgery in stable condition via wheelchair with Transport Services.  Copy of note given to patient and instructions to hand this note to surgery staff.    Please call [facility] [phone] if there are any questions.

## 2019-01-18 NOTE — ANESTHESIA PREPROCEDURE EVALUATION
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 \" in size     Dysfunction of eustachian tube 06    inward collapes of the conchal bowl (R) ear     Hypertension      PONV (postoperative nausea and vomiting)      Post term pregnancy, delivered with or without mention of antepartum condition     G2, P2     Tinnitus      Unspecified tinnitus      Past Surgical History:   Procedure Laterality Date     C AFF OUTER EAR SURGERY PROC UNLISTED  02    Excision hemangioma (R) ear canal     C REMV AURAL GLOMUS TUMOR,TRANSMASTOID  04    Resection of (R) glomus tumor using transmastoid & infracochlear approach-[facility]     GI SURGERY       HC COLONOSCOPY W BIOPSY  12      COLONOSCOPY W SNARE REMOVAL TUMOR/POLYP/LESION  12      HYSTEROSCOPY, SURGICAL; W/ ENDOMETRIAL ABLATION, ANY METHOD  11     HC LAP,TUBAL CAUTERY  98      LAPAROSCOPY, SURGICAL; CHOLECYSTECTOMY  13     HC OPEN RX DISTAL RADIUS FX, INTRA-ARTICULAR, 2 FRAG  4/6/10    LT     ORTHOPEDIC SURGERY       Anesthesia Evaluation     . Pt has had prior anesthetic.     History of anesthetic complications   - PONV        ROS/MED HX    ENT/Pulmonary:       Neurologic:     (+)other neuro tinnitus    Cardiovascular:     (+) hypertension----. : . . . :. .       METS/Exercise Tolerance:     Hematologic:  - neg hematologic  ROS       Musculoskeletal:  - neg musculoskeletal ROS       GI/Hepatic:  - neg GI/hepatic ROS       Renal/Genitourinary:  - ROS Renal section negative       Endo:     (+) Obesity, .      Psychiatric:  - neg psychiatric ROS       Infectious Disease:  - neg infectious " "disease ROS       Malignancy:         Other:                          Physical Exam  Normal systems: cardiovascular and pulmonary    Airway   Mallampati: II  TM distance: >3 FB  Neck ROM: full    Dental     Cardiovascular       Pulmonary             Lab Results   Component Value Date    WBC 4.9 10/18/2013    HGB 14.1 10/18/2013    HCT 42.1 10/18/2013     10/18/2013     10/18/2013    POTASSIUM 4.4 10/18/2013    CHLORIDE 104 10/18/2013    CO2 27 10/18/2013    BUN 12 10/18/2013    CR 0.67 10/18/2013     (H) 10/18/2013    JOHN 10.4 10/18/2013    ALBUMIN 4.0 10/18/2013    PROTTOTAL 7.1 10/18/2013    ALT 44 10/18/2013    AST 13 10/18/2013    ALKPHOS 76 10/18/2013    BILITOTAL 0.5 10/18/2013    LIPASE 61 10/18/2013    AMYLASE 51 10/18/2013    TSH 1.11 09/18/2008       Preop Vitals  BP Readings from Last 3 Encounters:   01/18/19 (!) 152/105   12/19/18 120/80   01/14/14 132/72    Pulse Readings from Last 3 Encounters:   12/19/18 86   11/15/13 64   10/23/13 68      Resp Readings from Last 3 Encounters:   01/18/19 18   12/19/18 16   07/22/13 18    SpO2 Readings from Last 3 Encounters:   01/18/19 97%   12/19/18 96%   12/17/10 99%      Temp Readings from Last 1 Encounters:   01/18/19 96.7  F (35.9  C) (Temporal)    Ht Readings from Last 1 Encounters:   01/18/19 1.651 m (5' 5\")      Wt Readings from Last 1 Encounters:   01/18/19 91.3 kg (201 lb 4.8 oz)    Estimated body mass index is 33.5 kg/m  as calculated from the following:    Height as of this encounter: 1.651 m (5' 5\").    Weight as of this encounter: 91.3 kg (201 lb 4.8 oz).       Anesthesia Plan      History & Physical Review  History and physical reviewed and following examination; no interval change.    ASA Status:  2 .    NPO Status:  > 8 hours    Plan for MAC Reason for MAC:  Deep or markedly invasive procedure (G8)         Postoperative Care  Postoperative pain management:  IV analgesics.      Consents  Anesthetic plan, risks, benefits and " alternatives discussed with:  Patient.  Use of blood products discussed: Yes.   Use of blood products discussed with Patient.  Consented to blood products.  .                 Klaus Buenrostro MD                    .

## 2019-01-18 NOTE — OP NOTE
General Surgery Operative Note      Pre-operative diagnosis: Right breast flat epithelial atypia   Post-operative diagnosis: Same   Procedure: Left seed-localized breast biopsy, 6 o'clock    Surgeon: Marifer Lopez MD   Assistant(s): Misty Kilpatrick PA-C  The Physician Assistant was medically necessary for their expertise in prepping, suctioning, suturing and retraction.   Anesthesia: Local with MAC    Estimated blood loss:   5 cc     Specimens: ID Type Source Tests Collected by Time Destination   A : Right breast seed excisional biopsy Tissue Breast, Right SURGICAL PATHOLOGY EXAM Marifer Lopez MD 1/18/2019 12:34 PM           INDICATIONS:  Right breast microcalcifications, 6 o'clock.  Percutaneous biopsy reveals flat epithelial atypia. We discussed excision and patient agreed to proceed.    DESCRIPTION OF PROCEDURE:  The patient was placed on the table in supine position.  Anesthetic was administered.  The Right breast was prepped and draped in standard sterile fashion.  We used the seed placed in the [facility] as well as the post-seed mammograms to localize the area of interest.  After anesthetizing the skin we made a 3cm radially oriented incision centered at the 6 o'clock position.  We carried the incision down into the breast tissue and excised the area of interest, including the seed.  This was marked with a short stitch superiorly and a long stitch laterally.  A specimen mammogram was performed and sent via PACS to the Breast Center.  The breast radiologist confirmed the presence of the area of interest including the seed and the clip which had been placed at the time of her biopsy.  Hemostasis was maintained throughout with electrocautery.  The skin was closed with running 4-0 Vicryl subcuticular suture and Dermabond.  The patient tolerated the procedure well.  Sponge and instrument counts were correct.    FINDINGS: calcifications, seed and clip excised    Marifer Lopez MD

## 2019-01-18 NOTE — DISCHARGE INSTRUCTIONS
HOME CARE FOLLOWING BREAST BIOPSY  TODD France, OLIVIER Alarcon R. O Donnell, J. Shaheen    RESULTS:  You may call for your final pathology report after 1p.m. two working days after surgery.    INCISIONAL CARE:    If you have a dressing in place, keep clean and dry for 48 hours; you may replace the gauze if it becomes soiled.    After 48 hours you may remove the dressing and shower.  Do not submerse incision in water for 1 week.    If you have a Dermabond dressing (a type of skin glue), you may shower immediately.    Sutures will absorb and do not need to be removed.    If present, leave the steri-strips (white paper tapes) in place for 14 days after surgery.    If present, leave Dermabond glue in place until it wears/flakes off.    You may expect a small amount of drainage from your incision.    A lump/ridge under the incision is normal and will gradually resolve.    SUPPORT:  Wear a bra for support and comfort for 3-7 days, day and night.    ACTIVITY:  Cautiously resume exercise and strenuous activities such as jogging, tennis, aerobics, etc. Also, be careful of stretching activities with operative side for two weeks.    DIET:  No restrictions.  Increased fluid intake is recommended. While taking pain medications, increase dietary fiber or add a fiber supplementation like Metamucil or Citrucel to help prevent constipation - a possible side effect of pain medications.    DISCOMFORT:  Local anesthetic placed at surgery should provide relief for 4-8 hours.  Begin taking pain pills before discomfort is severe.  Take the pain medication with some food, when possible, to minimize side effects.  Intermittent use of ice packs may help during the first 48 hours.  Expect gradual improvement.    RETURN APPOINTMENT:  Schedule a follow-up visit 2-3 weeks post-op.  Office Phone:  [phone]     CONTACT US IF THE FOLLOWING DEVELOPS:   1. A fever that is above 101     2. If there is a large amount of  drainage, bleeding, or swelling.   3. Severe pain that is not relieved by your prescription.   4. Drainage that is thick, cloudy, yellow, green or white.   5. Any other questions not answered by  Frequently Asked Questions  sheet.      FREQUENTLY ASKED QUESTIONS:    Q:  How should my incision look?    A:  Normally your incision will appear slightly swollen with light redness directly along the incision itself as it heals.  It may feel like a bump or ridge as the healing/scarring happens, and over time (3-4 months) this bump or ridge feeling should slowly go away.  In general, clear or pink watery drainage can be normal at first as your incision heals, but should decrease over time.    Q:  How do I know if my incision is infected?  A:  Look at your incision for signs of infection, like redness around the incision spreading to surrounding skin, or drainage of cloudy or foul-smelling drainage.  If you feel warm, check your temperature to see if you are running a fever.    **If any of these things occur, please notify the nurse at our office.  We may need you to come into the office for an incision check.      Q:  How do I take care of my incision?  A:  If you have a dressing in place - Starting the day after surgery, replace the dressing 1-2 times a day until there is no further drainage from the incision.  At that time, a dressing is no longer needed.  Try to minimize tape on the skin if irritation is occurring at the tape sites.  If you have significant irritation from tape on the skin, please call the office to discuss other method of dressing your incision.    Small pieces of tape called  steri-strips  may be present directly overlying your incision; these may be removed 10 days after surgery unless otherwise specified by your surgeon.  If these tapes start to loosen at the ends, you may trim them back until they fall off or are removed.    A:  If you had  Dermabond  tissue glue used as a dressing (this causes your  incision to look shiny with a clear covering over it) - This type of dressing wears off with time and does not require more dressings over the top unless it is draining around the glue as it wears off.  Do not apply ointments or lotions over the incisions until the glue has completely worn off.    Q:  There is a piece of tape or a sticky  lead  still on my skin.  Can I remove this?  A:  Sometimes the sticky  leads  used for monitoring during surgery or for evaluation in the emergency department are not all removed while you are in the hospital.  These sometimes have a tab or metal dot on them.  You can easily remove these on your own, like taking off a band-aid.  If there is a gel substance under the  lead , simply wipe/clean it off with a washcloth or paper towel.      Q:  What can I do to minimize constipation (very hard stools, or lack of stools)?  A:  Stay well hydrated.  Increase your dietary fiber intake or take a fiber supplement -with plenty of water.  Walk around frequently.  You may consider an over-the-counter stool-softener.  Your Pharmacist can assist you with choosing one that is stocked at your pharmacy.  Constipation is also one of the most common side effects of pain medication.  If you are using pain medication, be pro-active and try to PREVENT problems with constipation by taking the steps above BEFORE constipation becomes a problem.    Q:  What do I do if I need more pain medications?  A:  Call the office to receive refills.  Be aware that certain pain meds cannot be called into a pharmacy and actually require a paper prescription.  A change may be made in your pain med as you progress thru your recovery period or if you have side effects to certain meds.    --Pain meds are NOT refilled after 5pm on weekdays, and NOT AT ALL on the weekends, so please look ahead to prevent problems.      Q:  Why am I having a hard time sleeping now that I am at home?  A:  Many medications you receive while you are  in the hospital can impact your sleep for a number of days after your surgery/hospitalization.  Decreased level of activity and naps during the day may also make sleeping at night difficult.  Try to minimize day-time naps, and get up frequently during the day to walk around your home during your recovery time.  Sleep aides may be of some help, but are not recommended for long-term use.      Q:  I am having some back discomfort.  What should I do?  A:  This may be related to certain positioning that was required for your surgery, extended periods of time in bed, or other changes in your overall activity level.  You may try ice, heat, acetaminophen, or ibuprofen to treat this temporarily.  Note that many pain medications have acetaminophen in them and would state this on the prescription bottle.  Be sure not to exceed the maximum of 4000mg per day of acetaminophen.     **If the pain you are having does not resolve, is severe, or is a flare of back pain you have had on other occasions prior to surgery, please contact your primary physician for further recommendations or for an appointment to be examined at their office.    Q:  Why am I having headaches?  A:  Headaches can be caused by many things:  caffeine withdrawal, use of pain meds, dehydration, high blood pressure, lack of sleep, over-activity/exhaustion, flare-up of usual migraine headaches.  If you feel this is related to muscle tension (a band-like feeling around the head, or a pressure at the low-back of the head) you may try ice or heat to this area.  You may need to drink more fluids (try electrolyte drink like Gatorade), rest, or take your usual migraine medications.   **If your headaches do not resolve, worsen, are accompanied by other symptoms, or if your blood pressure is high, please call your primary physician for recommendation and/or examination.    Q:  I am unable to urinate.  What do I do?  A:  A small percentage of people can have difficulty  urinating initially after surgery.  This includes being able to urinate only a very small amount at a time and feeling discomfort or pressure in the very low abdomen.  This is called  urinary retention , and is actually an urgent situation.  Proceed to your nearest Emergency department for evaluation (not an Urgent Care Center).  Sometimes the bladder does not work correctly after certain medications you receive during surgery, or related to certain procedures.  You may need to have a catheter placed until your bladder recovers.  When planning to go to an Emergency department, it may help to call the ER to let them know you are coming in for this problem after a surgery.  This may help you get in quicker to be evaluated.  **If you have symptoms of a urinary tract infection, please contact your primary physician for the proper evaluation and treatment.          If you have other questions, please call the office Monday thru Friday between 8am and 5pm to discuss with the nurse or physician assistant.  [phone]    There is a surgeon ON CALL on weekday evenings and over the weekend in case of urgent need only, and may be contacted at the same number.    If you are having an emergency, call 911 or proceed to your nearest emergency department.    GENERAL ANESTHESIA OR SEDATION ADULT DISCHARGE INSTRUCTIONS   SPECIAL PRECAUTIONS FOR 24 HOURS AFTER SURGERY    IT IS NOT UNUSUAL TO FEEL LIGHT-HEADED OR FAINT, UP TO 24 HOURS AFTER SURGERY OR WHILE TAKING PAIN MEDICATION.  IF YOU HAVE THESE SYMPTOMS; SIT FOR A FEW MINUTES BEFORE STANDING AND HAVE SOMEONE ASSIST YOU WHEN YOU GET UP TO WALK OR USE THE BATHROOM.    YOU SHOULD REST AND RELAX FOR THE NEXT 24 HOURS AND YOU MUST MAKE ARRANGEMENTS TO HAVE SOMEONE STAY WITH YOU FOR AT LEAST 24 HOURS AFTER YOUR DISCHARGE.  AVOID HAZARDOUS AND STRENUOUS ACTIVITIES.  DO NOT MAKE IMPORTANT DECISIONS FOR 24 HOURS.    DO NOT DRIVE ANY VEHICLE OR OPERATE MECHANICAL EQUIPMENT FOR 24 HOURS  FOLLOWING THE END OF YOUR SURGERY.  EVEN THOUGH YOU MAY FEEL NORMAL, YOUR REACTIONS MAY BE AFFECTED BY THE MEDICATION YOU HAVE RECEIVED.    DO NOT DRINK ALCOHOLIC BEVERAGES FOR 24 HOURS FOLLOWING YOUR SURGERY.    DRINK CLEAR LIQUIDS (APPLE JUICE, GINGER ALE, 7-UP, BROTH, ETC.).  PROGRESS TO YOUR REGULAR DIET AS YOU FEEL ABLE.    YOU MAY HAVE A DRY MOUTH, A SORE THROAT, MUSCLES ACHES OR TROUBLE SLEEPING.  THESE SHOULD GO AWAY AFTER 24 HOURS.    CALL YOUR DOCTOR FOR ANY OF THE FOLLOWING:  SIGNS OF INFECTION (FEVER, GROWING TENDERNESS AT THE SURGERY SITE, A LARGE AMOUNT OF DRAINAGE OR BLEEDING, SEVERE PAIN, FOUL-SMELLING DRAINAGE, REDNESS OR SWELLING.    IT HAS BEEN OVER 8 TO 10 HOURS SINCE SURGERY AND YOU ARE STILL NOT ABLE TO URINATE (PASS WATER).

## 2019-01-18 NOTE — ANESTHESIA POSTPROCEDURE EVALUATION
Patient: Almaz Rivera    Procedure(s):  right breast seed localized excisional biopsy    Diagnosis:right breast atyipa  Diagnosis Additional Information:  : Marifer Lopez MD (Physician)       General Surgery Operative Note                              Pre-operative diagnosis: Right breast flat epithelial atypia  Post-operative diagnosis: Same  Procedure: Left seed-localized breast biopsy, 6 , o'clock   Surgeon: Marifer Lopez MD  Assistant(s): Misty Kilpatrick PA-C  The Physician Assistant was medically necessary for their expertise in prepping, suctioning, suturing and retraction.  Anesthesia: Local with MAC   Estimated blood loss:    5,  cc     Specimens:   ID Type Source Tests Collected by Time Destination  A : Right                     Anesthesia Type:  MAC    Note:  Anesthesia Post Evaluation    Patient location during evaluation: PACU  Patient participation: Able to fully participate in evaluation  Level of consciousness: awake  Pain management: adequate  Airway patency: patent  Cardiovascular status: acceptable  Respiratory status: acceptable  Hydration status: acceptable  PONV: none     Anesthetic complications: None          Last vitals:  Vitals:    01/18/19 1255 01/18/19 1302 01/18/19 1318   BP: 107/64 100/66 122/87   Resp: 18  16   Temp:      SpO2:   98%         Electronically Signed By: Klaus Buenrostro MD  January 18, 2019  2:06 PM

## 2019-01-21 LAB — COPATH REPORT: NORMAL

## 2019-01-22 ENCOUNTER — MYC MEDICAL ADVICE (OUTPATIENT)
Dept: SURGERY | Facility: CLINIC | Age: 59
End: 2019-01-22

## 2019-01-22 NOTE — TELEPHONE ENCOUNTER
Almaz, Dr Lopez said the results should be available now.     For Oncology you can see Dr Salguero or Dr Vazquez you can call and schedule the appointment or I can help you get that set up.     Dr Salguero is at [facility] [phone]  Dr Vazquez is at [facility] [phone]    Both are near our office if you would like me to help set that up let me know Thanks Priscilla

## 2019-01-28 ENCOUNTER — NURSE TRIAGE (OUTPATIENT)
Dept: NURSING | Facility: CLINIC | Age: 59
End: 2019-01-28

## 2019-01-29 ENCOUNTER — TELEPHONE (OUTPATIENT)
Dept: SURGERY | Facility: CLINIC | Age: 59
End: 2019-01-29

## 2019-01-29 NOTE — TELEPHONE ENCOUNTER
GENERAL SURGERY NURSE PHONE TRIAGE   Almaz Rivera    MRN# [id number]  AGE:  58 year old  YOB: 1960  [phone] (work) Mobile [phone]   Surgeon: Dr. Lopez  Surgical Assist:  Msity Kilpatrick PA-C     Surgery type: Left seed-localized breast biopsy, 6 o'clock      Surgery Date: January / 18 / 2019     POD: 11     CHIEF CONCERN:  ED visit last night     HISTORY OF PRESENT ILLNESS:   Per patient:  Patient was seen in ED ([facility]) last night- diagnosed with po infection.  Rx for clindimyacin 300 mg q 6 hours.    PO history-  Reports she was feeling fine, incision is under breast, patient had a difficult time seeing incision, had some discomfort from friction, rubbing.    Was wearing a sports bra. Using hairdryer after showering.    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  Got home, smell was worse.  Checked incision- open with thick purulent drainage.    Called [place] nurse advisers and went to [facility].  Her concerns / questions include  1. Is this the correct antibiotic?  2.  Concerned wound was not cultured when requested by her.  3. Concerned a topical antibiotic ointment was not ordered.     Patient reports a decrease in redness from last night.  Afebrile, no report of noc sweats, nausea or fatigue.  Incision is still open and draining.    We discussed wound infections.  Decreased vascularization and healing by secondary intention.  Good sign that redness has decreased, as has discomfort.    Still may need to be seen in clinic.  (patient thought a picture by email would suffice.)  physically assessing wound (including probing to check for possible tracts)   may be needed if wound does not continue to improve.    Patient is aware, Dr. Lopez is in surgery and will not be available until later this afternoon.    PLAN:   Route to Dr. Lopez- review antibiotic rxed by [facility] yesterday- any other orders, plan.  Pt is in  agreement with this plan.  Janay Masterson RN on 1/29/2019 at 9:34 AM    Addendum:   Per Dr. Lopez pt notified:  Clindamycin is fine, no ointment. Wash wound with water only   If drainage and redness continues, pt should be seen again to assess if it needs to be opened further. If getting better complete course of antibiotics and come see us as scheduled.   Pt can come back to our clinic sooner if ongoing concern, Dr. Lopez or PA's may see.    Patient also recommended to call clinic with any changes, questions or concerns.  Janay Masterson RN on 1/29/2019 at 10:57 AM

## 2019-01-29 NOTE — TELEPHONE ENCOUNTER
"\"I had a breast biopsy on the 18th and my incision has been fine\".  Caller is reporting today she noticed a \"funky\" smell form site and tonight when changing into pajamas she see's purulent drainage and her breast is red.  She initially asked if she could use mupirocin topically that she has left over.  Advised that I would not do that. She denies running a fever.  Offered to page on call provider covering for Dr. Lopez to discuss further.  Caller declined offer to page.  She has decided she will go into [facility]/ in [place] tonight.    Megan Serrano RN  [facility]      "

## 2019-02-01 ENCOUNTER — OFFICE VISIT (OUTPATIENT)
Dept: SURGERY | Facility: CLINIC | Age: 59
End: 2019-02-01
Payer: COMMERCIAL

## 2019-02-01 VITALS
HEART RATE: 72 BPM | WEIGHT: 201 LBS | OXYGEN SATURATION: 98 % | RESPIRATION RATE: 16 BRPM | SYSTOLIC BLOOD PRESSURE: 138 MMHG | BODY MASS INDEX: 33.49 KG/M2 | HEIGHT: 65 IN | DIASTOLIC BLOOD PRESSURE: 88 MMHG

## 2019-02-01 DIAGNOSIS — Z09 SURGICAL FOLLOWUP VISIT: Primary | ICD-10-CM

## 2019-02-01 PROCEDURE — 99024 POSTOP FOLLOW-UP VISIT: CPT | Performed by: SURGERY

## 2019-02-01 RX ORDER — CLINDAMYCIN HCL 300 MG
300 CAPSULE ORAL 4 TIMES DAILY
COMMUNITY

## 2019-02-01 ASSESSMENT — MIFFLIN-ST. JEOR: SCORE: 1502.61

## 2019-02-01 NOTE — PROGRESS NOTES
Subjective:  Almaz is here for her first postoperative visit. She underwent right breast seed localized excisional biopsy 2 weeks ago, 1/18/19.  She was seen in the emergency department in [place] and evaluated 1-28-19 for redness and drainage from the incision.  She was prescribed clindamycin.  Per the ED provider note there was dried drainage and erythema.  She is 3 days and reports decreased redness and the drainage has changed to a thinner more bloody type fluid.    Objective:  Breast - no edema, no ecchymosis  Incisions - 6 o'clock incision fibrinous exudates. Probing expresses serous fluid  from the cavity. There is no purulence. Minimal surrounding erythema    Path:   FINAL DIAGNOSIS:   Breast, right, seed localized excisional biopsy.   - Patchy small foci consistent with lobular neoplasia encompassing the   spectrum of atypical lobular   hyperplasia and lobular carcinoma in situ.   - Proliferative fibrocystic changes with areas of florid intraductal   hyperplasia, columnar cell change and   columnar cell hyperplasia.   - Prior biopsy site changes and microcalcifications present.   - Negative for malignancy.     Plan:  Continue 10 day course of clindamycin as prescribed  Follow up visit next week to monitor wound.   I discussed with her as the fibrinous exudates slough the wound may open more and she may end up having to pack a cavity. For now continue catching serous drainage with absorbant gauze pads.  She may shower with mild soap, no creams, ointments or peroxide on the wound.  Follow up with Dr Salguero in oncology to discuss hormone therapy for finding of LCIS    Marifer Lopez MD      Please route or send letter to:  Primary Care Provider (PCP) and Dr. Salguero

## 2019-02-06 ENCOUNTER — OFFICE VISIT (OUTPATIENT)
Dept: SURGERY | Facility: CLINIC | Age: 59
End: 2019-02-06
Payer: COMMERCIAL

## 2019-02-06 ENCOUNTER — TRANSFERRED RECORDS (OUTPATIENT)
Dept: HEALTH INFORMATION MANAGEMENT | Facility: CLINIC | Age: 59
End: 2019-02-06

## 2019-02-06 VITALS
OXYGEN SATURATION: 97 % | BODY MASS INDEX: 33.49 KG/M2 | RESPIRATION RATE: 16 BRPM | DIASTOLIC BLOOD PRESSURE: 88 MMHG | SYSTOLIC BLOOD PRESSURE: 130 MMHG | HEART RATE: 93 BPM | WEIGHT: 201 LBS | HEIGHT: 65 IN

## 2019-02-06 DIAGNOSIS — Z09 SURGICAL FOLLOWUP VISIT: Primary | ICD-10-CM

## 2019-02-06 PROCEDURE — 99024 POSTOP FOLLOW-UP VISIT: CPT | Performed by: SURGERY

## 2019-02-06 ASSESSMENT — MIFFLIN-ST. JEOR: SCORE: 1502.61

## 2019-02-06 NOTE — LETTER
"2019    RE: Almaz Rivera, : 1960      Surgical consultants follow-up visit     Patient presents for second postop visit after the localized right breast biopsy on .  Postop incision was red and had some drainage and she got clindamycin at [facility] urgent care.  She saw me in follow-up 5 days ago and at that time there was no erythema or cellulitis, but  there was serous drainage from the wound.  I probed the wound and had additional serous drainage.  She has almost finished the course of antibiotics and has not noticed any more purulence or redness.  She has been changing a single piece of gauze once a day with a small amount of drainage each day   /88 (BP Location: Left arm, Cuff Size: Adult Large)   Pulse 93   Resp 16   Ht 1.651 m (5' 5\")   Wt 91.2 kg (201 lb)   LMP 2013 (Exact Date)   SpO2 97%   BMI 33.45 kg/m    Today the incision has mild surrounding reactive redness with a layer of fibrinous exudate over the incision.  There is a small amount of serous drainage on the bandage she has been wearing.     She is aware of the fibrinous exudates will likely continue to slough off over time and the cavity will close over the next couple weeks.  She is seeing Dr. Salguero in oncology today for discussion on estrogen therapy  RTC prn     Marifer Lopez MD      "

## 2019-02-06 NOTE — PROGRESS NOTES
"Surgical consultants follow-up visit    Patient presents for second postop visit after the localized right breast biopsy on 1/18.  Postop incision was red and had some drainage and she got clindamycin at [facility] urgent care.  She saw me in follow-up 5 days ago and at that time there was no erythema or cellulitis, but  there was serous drainage from the wound.  I probed the wound and had additional serous drainage.  She has almost finished the course of antibiotics and has not noticed any more purulence or redness.  She has been changing a single piece of gauze once a day with a small amount of drainage each day   /88 (BP Location: Left arm, Cuff Size: Adult Large)   Pulse 93   Resp 16   Ht 1.651 m (5' 5\")   Wt 91.2 kg (201 lb)   LMP 11/05/2013 (Exact Date)   SpO2 97%   BMI 33.45 kg/m    Today the incision has mild surrounding reactive redness with a layer of fibrinous exudate over the incision.  There is a small amount of serous drainage on the bandage she has been wearing.    She is aware of the fibrinous exudates will likely continue to slough off over time and the cavity will close over the next couple weeks.  She is seeing Dr. Salguero in oncology today for discussion on estrogen therapy  RTC prn    Marifer Lopez MD      "

## 2019-11-03 ENCOUNTER — HEALTH MAINTENANCE LETTER (OUTPATIENT)
Age: 59
End: 2019-11-03

## 2020-11-16 ENCOUNTER — HEALTH MAINTENANCE LETTER (OUTPATIENT)
Age: 60
End: 2020-11-16

## 2021-04-04 ENCOUNTER — HEALTH MAINTENANCE LETTER (OUTPATIENT)
Age: 61
End: 2021-04-04

## 2021-09-18 ENCOUNTER — HEALTH MAINTENANCE LETTER (OUTPATIENT)
Age: 61
End: 2021-09-18

## 2022-01-08 ENCOUNTER — HEALTH MAINTENANCE LETTER (OUTPATIENT)
Age: 62
End: 2022-01-08

## 2022-11-20 ENCOUNTER — HEALTH MAINTENANCE LETTER (OUTPATIENT)
Age: 62
End: 2022-11-20

## 2023-04-15 ENCOUNTER — HEALTH MAINTENANCE LETTER (OUTPATIENT)
Age: 63
End: 2023-04-15

## (undated) DEVICE — SLEEVE PROTECTIVE BREAST BIOPSY  GMSLV001-10

## (undated) DEVICE — PREP SKIN SCRUB TRAY 4461A

## (undated) DEVICE — SYR EAR BULB 3OZ 0035830

## (undated) DEVICE — DRAPE LAP PEDS DISP 29492

## (undated) DEVICE — SU VICRYL 3-0 SH 27" UND J416H

## (undated) DEVICE — SU VICRYL 4-0 PS-2 18" UND J496H

## (undated) DEVICE — SUCTION CANISTER MEDIVAC LINER 3000ML W/LID 65651-530

## (undated) DEVICE — SU SILK 3-0 PS-1 18" 1684G

## (undated) DEVICE — SUCTION TIP YANKAUER W/O VENT K86

## (undated) DEVICE — LINEN TOWEL PACK X10 5473

## (undated) DEVICE — PAD CHUX UNDERPAD 30X36" P3036C

## (undated) DEVICE — GLOVE PROTEXIS MICRO 6.5  2D73PM65

## (undated) DEVICE — PACK MINOR CUSTOM RIDGES SBA32RMRMA

## (undated) DEVICE — BAG CLEAR TRASH 1.3M 39X33" P4040C

## (undated) DEVICE — GLOVE PROTEXIS BLUE W/NEU-THERA 7.0  2D73EB70

## (undated) DEVICE — LINEN HALF SHEET 5512

## (undated) DEVICE — LINEN FULL SHEET 5511

## (undated) RX ORDER — CEFAZOLIN SODIUM 2 G/100ML
INJECTION, SOLUTION INTRAVENOUS
Status: DISPENSED
Start: 2019-01-18

## (undated) RX ORDER — FENTANYL CITRATE 50 UG/ML
INJECTION, SOLUTION INTRAMUSCULAR; INTRAVENOUS
Status: DISPENSED
Start: 2019-01-18

## (undated) RX ORDER — PROPOFOL 10 MG/ML
INJECTION, EMULSION INTRAVENOUS
Status: DISPENSED
Start: 2019-01-18

## (undated) RX ORDER — LIDOCAINE HYDROCHLORIDE AND EPINEPHRINE 10; 10 MG/ML; UG/ML
INJECTION, SOLUTION INFILTRATION; PERINEURAL
Status: DISPENSED
Start: 2019-01-18

## (undated) RX ORDER — GLYCOPYRROLATE 0.2 MG/ML
INJECTION INTRAMUSCULAR; INTRAVENOUS
Status: DISPENSED
Start: 2019-01-18

## (undated) RX ORDER — ONDANSETRON 2 MG/ML
INJECTION INTRAMUSCULAR; INTRAVENOUS
Status: DISPENSED
Start: 2019-01-18

## (undated) RX ORDER — DEXAMETHASONE SODIUM PHOSPHATE 4 MG/ML
INJECTION, SOLUTION INTRA-ARTICULAR; INTRALESIONAL; INTRAMUSCULAR; INTRAVENOUS; SOFT TISSUE
Status: DISPENSED
Start: 2019-01-18

## (undated) RX ORDER — BUPIVACAINE HYDROCHLORIDE 5 MG/ML
INJECTION, SOLUTION EPIDURAL; INTRACAUDAL
Status: DISPENSED
Start: 2019-01-18